# Patient Record
Sex: FEMALE | Race: BLACK OR AFRICAN AMERICAN | NOT HISPANIC OR LATINO | Employment: UNEMPLOYED | URBAN - METROPOLITAN AREA
[De-identification: names, ages, dates, MRNs, and addresses within clinical notes are randomized per-mention and may not be internally consistent; named-entity substitution may affect disease eponyms.]

---
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
## 2024-01-03 ENCOUNTER — TELEPHONE (OUTPATIENT)
Dept: PEDIATRICS | Facility: CLINIC | Age: 24
End: 2024-01-03
Payer: MEDICAID

## 2024-01-03 NOTE — TELEPHONE ENCOUNTER
----- Message from Luz Blount sent at 1/2/2024  4:15 PM CST -----  Contact: [phone]  Sterling is calling in regards to scheduling a meet and greet with provider. She is currently pregnant and is looking for a pediatric doctor for baby. Please call her back at [phone]. Thanks KB

## 2024-01-03 NOTE — TELEPHONE ENCOUNTER
----- Message from Litzy Washington sent at 1/3/2024 12:42 PM CST -----  Contact: Sterling  .Type:  Patient Returning Call    Who Called: Sterling   Who Left Message for Patient: Candice   Does the patient know what this is regarding?: Parent meet and greet   Would the patient rather a call back or a response via [facility]?  Call   Best Call Back Number: .[phone]   Additional Information:

## 2024-01-03 NOTE — TELEPHONE ENCOUNTER
Called mom back. She would like to schedule meet and greet. She is due at the end of April but states she may have to be induced sooner due to a previous pregnancy with preeclampsia. Scheduled appt. Parent/guardian verbalized understanding

## 2024-03-06 ENCOUNTER — TELEPHONE (OUTPATIENT)
Dept: PEDIATRICS | Facility: CLINIC | Age: 24
End: 2024-03-06
Payer: MEDICAID

## 2024-03-06 NOTE — TELEPHONE ENCOUNTER
Returned call to patient, she stated she has another appointment close to the same time of appt today with Dr. Rodriguez and was asking if we could reschedule to a later time today. I informed pt that she didn't have any openings to day but does have a virtual tomorrow. Appt scheduled for tomorrow at 11:30 virtually. Pt VU.  ----- Message from Nicolas Hong sent at 3/6/2024  7:40 AM CST -----  Contact: Sterling Evans would like a call back at [phone], in regards to rescheduling her meet and greet.

## 2024-03-07 ENCOUNTER — OFFICE VISIT (OUTPATIENT)
Dept: PEDIATRICS | Facility: CLINIC | Age: 24
End: 2024-03-07
Payer: MEDICAID

## 2024-03-07 DIAGNOSIS — Z76.81 EXPECTANT PARENT PREBIRTH PEDIATRICIAN VISIT: Primary | ICD-10-CM

## 2024-03-07 PROCEDURE — 99499 UNLISTED E&M SERVICE: CPT | Mod: S$PBB,,, | Performed by: PEDIATRICS

## 2024-03-07 NOTE — PROGRESS NOTES
Pregnant mother 33wk 5d gestation expected to deliver baby boy at [facility], presents for meet and greet to establish care. She currently has an older son who sees Aubrey and Nicolás but she would like to establish care elsewhere. All question were answered discussed possible labs that might occur due to history of maternal syphilis (treated adequately), and older brother with history of jaundice.